# Patient Record
Sex: MALE | Race: WHITE | Employment: FULL TIME | URBAN - METROPOLITAN AREA
[De-identification: names, ages, dates, MRNs, and addresses within clinical notes are randomized per-mention and may not be internally consistent; named-entity substitution may affect disease eponyms.]

---

## 2018-04-20 PROBLEM — F06.30 MOOD DISORDER DUE TO KNOWN PHYSIOLOGICAL CONDITION, UNSPECIFIED: Status: ACTIVE | Noted: 2018-04-20

## 2018-04-21 PROBLEM — F11.10 OPIATE ABUSE, EPISODIC (HCC): Status: ACTIVE | Noted: 2018-04-21

## 2018-04-21 PROBLEM — F32.9 MAJOR DEPRESSIVE DISORDER WITHOUT PSYCHOTIC FEATURES: Status: ACTIVE | Noted: 2018-04-20

## 2018-04-21 PROBLEM — F12.20 CANNABIS DEPENDENCE, CONTINUOUS (HCC): Status: ACTIVE | Noted: 2018-04-21

## 2018-04-21 PROBLEM — F43.10 PTSD (POST-TRAUMATIC STRESS DISORDER): Status: ACTIVE | Noted: 2018-04-21

## 2018-04-21 PROBLEM — F60.9 PERSONALITY DISORDER IN ADULT (HCC): Status: ACTIVE | Noted: 2018-04-21

## 2018-04-21 PROBLEM — F13.10 SEDATIVE, HYPNOTIC OR ANXIOLYTIC ABUSE, EPISODIC (HCC): Status: ACTIVE | Noted: 2018-04-21

## 2023-05-13 ENCOUNTER — APPOINTMENT (OUTPATIENT)
Dept: CT IMAGING | Age: 28
End: 2023-05-13

## 2023-05-13 ENCOUNTER — APPOINTMENT (OUTPATIENT)
Dept: GENERAL RADIOLOGY | Age: 28
End: 2023-05-13

## 2023-05-13 ENCOUNTER — HOSPITAL ENCOUNTER (EMERGENCY)
Age: 28
Discharge: HOME | End: 2023-05-14
Attending: EMERGENCY MEDICINE

## 2023-05-13 VITALS
SYSTOLIC BLOOD PRESSURE: 126 MMHG | HEART RATE: 84 BPM | TEMPERATURE: 97.6 F | RESPIRATION RATE: 18 BRPM | HEIGHT: 76 IN | OXYGEN SATURATION: 100 % | BODY MASS INDEX: 19.17 KG/M2 | WEIGHT: 157.41 LBS | DIASTOLIC BLOOD PRESSURE: 86 MMHG

## 2023-05-13 DIAGNOSIS — S02.2XXA CLOSED FRACTURE OF NASAL BONE, INITIAL ENCOUNTER: Primary | ICD-10-CM

## 2023-05-13 PROCEDURE — 72170 X-RAY EXAM OF PELVIS: CPT

## 2023-05-13 PROCEDURE — 70450 CT HEAD/BRAIN W/O DYE: CPT

## 2023-05-13 PROCEDURE — 70486 CT MAXILLOFACIAL W/O DYE: CPT

## 2023-05-13 PROCEDURE — 72125 CT NECK SPINE W/O DYE: CPT

## 2023-05-13 PROCEDURE — 99284 EMERGENCY DEPT VISIT MOD MDM: CPT

## 2023-05-13 PROCEDURE — 71045 X-RAY EXAM CHEST 1 VIEW: CPT

## 2023-05-13 PROCEDURE — 6370000000 HC RX 637 (ALT 250 FOR IP): Performed by: EMERGENCY MEDICINE

## 2023-05-13 RX ORDER — AMOXICILLIN AND CLAVULANATE POTASSIUM 875; 125 MG/1; MG/1
1 TABLET, FILM COATED ORAL 2 TIMES DAILY
Qty: 20 TABLET | Refills: 0 | Status: SHIPPED | OUTPATIENT
Start: 2023-05-13 | End: 2023-05-14 | Stop reason: SDUPTHER

## 2023-05-13 RX ADMIN — IBUPROFEN 600 MG: 400 TABLET ORAL at 23:55

## 2023-05-13 ASSESSMENT — ENCOUNTER SYMPTOMS
ABDOMINAL DISTENTION: 0
EYE DISCHARGE: 0
APNEA: 0
STRIDOR: 0
DIARRHEA: 0
ANAL BLEEDING: 0
NAUSEA: 0
SHORTNESS OF BREATH: 0
VOMITING: 0
PHOTOPHOBIA: 0
WHEEZING: 0
EYE ITCHING: 0
RECTAL PAIN: 0
EYE REDNESS: 0
BACK PAIN: 0
BLOOD IN STOOL: 0
CHEST TIGHTNESS: 0
CONSTIPATION: 0
ABDOMINAL PAIN: 0
FACIAL SWELLING: 1
EYE PAIN: 0
COUGH: 0
CHOKING: 0
COLOR CHANGE: 0

## 2023-05-13 ASSESSMENT — PAIN SCALES - GENERAL: PAINLEVEL_OUTOF10: 9

## 2023-05-13 ASSESSMENT — PAIN - FUNCTIONAL ASSESSMENT: PAIN_FUNCTIONAL_ASSESSMENT: 0-10

## 2023-05-14 RX ORDER — AMOXICILLIN AND CLAVULANATE POTASSIUM 875; 125 MG/1; MG/1
1 TABLET, FILM COATED ORAL 2 TIMES DAILY
Qty: 20 TABLET | Refills: 0 | Status: SHIPPED | OUTPATIENT
Start: 2023-05-14 | End: 2023-05-24

## 2023-05-16 ENCOUNTER — TELEPHONE (OUTPATIENT)
Dept: ENT CLINIC | Age: 28
End: 2023-05-16

## 2023-06-07 NOTE — TELEPHONE ENCOUNTER
Patient notified to schedule appointment with Dr. Sulaiman Escobar for hospital follow up. Patient states he is unable to talk and disconnected.

## 2024-02-04 ENCOUNTER — OFFICE VISIT (OUTPATIENT)
Age: 29
End: 2024-02-04

## 2024-02-04 VITALS
DIASTOLIC BLOOD PRESSURE: 65 MMHG | BODY MASS INDEX: 19.63 KG/M2 | HEIGHT: 76 IN | WEIGHT: 161.2 LBS | SYSTOLIC BLOOD PRESSURE: 102 MMHG | HEART RATE: 71 BPM | TEMPERATURE: 97.9 F | OXYGEN SATURATION: 97 %

## 2024-02-04 DIAGNOSIS — K04.7 DENTAL ABSCESS: Primary | ICD-10-CM

## 2024-02-04 RX ORDER — PREDNISONE 20 MG/1
20 TABLET ORAL DAILY
Qty: 5 TABLET | Refills: 0 | Status: SHIPPED | OUTPATIENT
Start: 2024-02-04 | End: 2024-02-09

## 2024-02-04 RX ORDER — CLINDAMYCIN HYDROCHLORIDE 300 MG/1
300 CAPSULE ORAL 3 TIMES DAILY
Qty: 30 CAPSULE | Refills: 0 | Status: SHIPPED | OUTPATIENT
Start: 2024-02-04 | End: 2024-02-14